# Patient Record
Sex: MALE | Race: WHITE | Employment: OTHER | URBAN - METROPOLITAN AREA
[De-identification: names, ages, dates, MRNs, and addresses within clinical notes are randomized per-mention and may not be internally consistent; named-entity substitution may affect disease eponyms.]

---

## 2022-03-26 ASSESSMENT — PROMIS GLOBAL HEALTH SCALE
IN THE PAST 7 DAYS, HOW OFTEN HAVE YOU BEEN BOTHERED BY EMOTIONAL PROBLEMS, SUCH AS FEELING ANXIOUS, DEPRESSED, OR IRRITABLE [ON A SCALE FROM 1 (NEVER) TO 5 (ALWAYS)]?: 2
IN THE PAST 7 DAYS, HOW WOULD YOU RATE YOUR FATIGUE ON AVERAGE [ON A SCALE FROM 1 (NONE) TO 5 (VERY SEVERE)]?: 3
TO WHAT EXTENT ARE YOU ABLE TO CARRY OUT YOUR EVERYDAY PHYSICAL ACTIVITIES SUCH AS WALKING, CLIMBING STAIRS, CARRYING GROCERIES, OR MOVING A CHAIR [ON A SCALE OF 1 (NOT AT ALL) TO 5 (COMPLETELY)]?: 2
IN GENERAL, HOW WOULD YOU RATE YOUR PHYSICAL HEALTH [ON A SCALE OF 1 (POOR) TO 5 (EXCELLENT)]?: 3
WHO IS THE PERSON COMPLETING THE PROMIS V1.1 SURVEY?: 0
IN GENERAL, HOW WOULD YOU RATE YOUR MENTAL HEALTH, INCLUDING YOUR MOOD AND YOUR ABILITY TO THINK [ON A SCALE OF 1 (POOR) TO 5 (EXCELLENT)]?: 4
IN GENERAL, PLEASE RATE HOW WELL YOU CARRY OUT YOUR USUAL SOCIAL ACTIVITIES (INCLUDES ACTIVITIES AT HOME, AT WORK, AND IN YOUR COMMUNITY, AND RESPONSIBILITIES AS A PARENT, CHILD, SPOUSE, EMPLOYEE, FRIEND, ETC) [ON A SCALE OF 1 (POOR) TO 5 (EXCELLENT)]?: 1
IN GENERAL, WOULD YOU SAY YOUR HEALTH IS...[ON A SCALE OF 1 (POOR) TO 5 (EXCELLENT)]: 3
SUM OF RESPONSES TO QUESTIONS 3, 6, 7, & 8: 15
IN GENERAL, HOW WOULD YOU RATE YOUR SATISFACTION WITH YOUR SOCIAL ACTIVITIES AND RELATIONSHIPS [ON A SCALE OF 1 (POOR) TO 5 (EXCELLENT)]?: 3
SUM OF RESPONSES TO QUESTIONS 2, 4, 5, & 10: 12
IN THE PAST 7 DAYS, HOW WOULD YOU RATE YOUR PAIN ON AVERAGE [ON A SCALE FROM 0 (NO PAIN) TO 10 (WORST IMAGINABLE PAIN)]?: 7
IN GENERAL, WOULD YOU SAY YOUR QUALITY OF LIFE IS...[ON A SCALE OF 1 (POOR) TO 5 (EXCELLENT)]: 3
HOW IS THE PROMIS V1.1 BEING ADMINISTERED?: 2

## 2022-03-26 ASSESSMENT — HOOS JR
HOOS JR RAW SCORE: 13
BENDING TO THE FLOOR TO PICK UP OBJECT: 2
WALKING ON UNEVEN SURFACE: 3
RISING FROM SITTING: 2
GOING UP OR DOWN STAIRS: 3
LYING IN BED (TURNING OVER, MAINTAINING HIP POSITION): 2
SITTING: 1

## 2022-04-08 ENCOUNTER — HOSPITAL ENCOUNTER (OUTPATIENT)
Dept: PREADMISSION TESTING | Age: 72
Discharge: HOME | End: 2022-04-08
Payer: MEDICARE

## 2022-04-08 VITALS
RESPIRATION RATE: 18 BRPM | TEMPERATURE: 98 F | SYSTOLIC BLOOD PRESSURE: 163 MMHG | BODY MASS INDEX: 38.34 KG/M2 | DIASTOLIC BLOOD PRESSURE: 74 MMHG | OXYGEN SATURATION: 95 % | HEIGHT: 68 IN | WEIGHT: 253 LBS | HEART RATE: 70 BPM

## 2022-04-08 PROCEDURE — 87081 CULTURE SCREEN ONLY: CPT

## 2022-04-08 RX ORDER — LOSARTAN POTASSIUM 100 MG/1
100 TABLET ORAL DAILY
COMMUNITY

## 2022-04-08 RX ORDER — ATORVASTATIN CALCIUM 20 MG/1
20 TABLET, FILM COATED ORAL DAILY
COMMUNITY

## 2022-04-08 RX ORDER — GABAPENTIN 300 MG/1
300 CAPSULE ORAL 3 TIMES DAILY
COMMUNITY

## 2022-04-08 RX ORDER — HYDROCODONE BITARTRATE AND ACETAMINOPHEN 7.5; 325 MG/1; MG/1
1 TABLET ORAL EVERY 8 HOURS PRN
Status: ON HOLD | COMMUNITY
End: 2022-04-14 | Stop reason: HOSPADM

## 2022-04-08 RX ORDER — ASPIRIN 81 MG/1
81 TABLET ORAL DAILY
Status: ON HOLD | COMMUNITY
End: 2022-04-14 | Stop reason: HOSPADM

## 2022-04-08 RX ORDER — VENLAFAXINE HYDROCHLORIDE 150 MG/1
150 CAPSULE, EXTENDED RELEASE ORAL DAILY
COMMUNITY

## 2022-04-08 RX ORDER — MELOXICAM 15 MG/1
15 TABLET ORAL DAILY
COMMUNITY

## 2022-04-08 RX ORDER — DOXAZOSIN MESYLATE 4 MG/1
4 TABLET ORAL 2 TIMES DAILY
COMMUNITY

## 2022-04-08 RX ORDER — NAPROXEN 250 MG/1
250 TABLET ORAL 2 TIMES DAILY WITH MEALS
Status: ON HOLD | COMMUNITY
End: 2022-04-14 | Stop reason: HOSPADM

## 2022-04-08 RX ORDER — LEVOTHYROXINE SODIUM 0.03 MG/1
25 TABLET ORAL DAILY
COMMUNITY

## 2022-04-08 RX ORDER — MIRTAZAPINE 30 MG/1
30 TABLET, FILM COATED ORAL NIGHTLY
COMMUNITY

## 2022-04-08 RX ORDER — CARVEDILOL 25 MG/1
25 TABLET ORAL 2 TIMES DAILY WITH MEALS
COMMUNITY

## 2022-04-08 ASSESSMENT — HOOS JR
BENDING TO THE FLOOR TO PICK UP OBJECT: 2
SITTING: 2
RISING FROM SITTING: 4
LYING IN BED (TURNING OVER, MAINTAINING HIP POSITION): 1
GOING UP OR DOWN STAIRS: 4
HOOS JR RAW SCORE: 17
WALKING ON UNEVEN SURFACE: 4

## 2022-04-08 NOTE — PROGRESS NOTES
[facility] PRE-ADMISSION TESTING INSTRUCTIONS  Surgery date 4-13-22   Arrival time 10:30 a.m. The Preadmission Testing patient is instructed accordingly using the following criteria (check applicable):    ARRIVAL INSTRUCTIONS:  [x] Parking the day of Surgery is located in the Main Entrance lot. Upon entering the door, make an immediate right to the surgery reception desk    [x] Bring photo ID and insurance card    [] Bring in a copy of Living will or Durable Power of  papers. [] Please be sure to arrange for responsible adult to provide transportation to and from the hospital    [] Please arrange for responsible adult to be with you for the 24 hour period post procedure due to having anesthesia      GENERAL INSTRUCTIONS:    [x] Nothing by mouth after midnight, including gum, candy, mints or water    [x] You may brush your teeth, but do not swallow any water    [x] Take medications as instructed with 1-2 oz of water    [x] Stop herbal supplements and vitamins 5 days prior to procedure    [x] Follow preop dosing of blood thinners per physician instructions    [] Take 1/2 dose of evening insulin, but no insulin after midnight    [] No oral diabetic medications after midnight    [] If diabetic and have low blood sugar or feel symptomatic, take 1-2oz apple juice only    [] Bring inhalers day of surgery    [] Bring C-PAP/ Bi-Pap day of surgery    [] Bring urine specimen day of surgery    [] Shower or bath with soap, lather and rinse well, AM of Surgery, no lotion, powders or creams to surgical site    [] Follow bowel prep as instructed per surgeon    [x] No tobacco products within 24 hours of surgery     [x] No alcohol or illegal drug use within 24 hours of surgery.     [x] Jewelry, body piercing's, eyeglasses, contact lenses and dentures are not permitted into surgery (bring cases)      [] Please do not wear any nail polish, make up or hair products on the day of surgery    [x] You can expect a call the business day prior to procedure to notify you if your arrival time changes    [x] If you receive a survey after surgery we would greatly appreciate your comments    [] Parent/guardian of a minor must accompany their child and remain on the premises  the entire time they are under our care     [] Pediatric patients may bring favorite toy, blanket or comfort item with them    [] A caregiver or family member must remain with the patient during their stay if they are mentally handicapped, have dementia, disoriented or unable to use a call light or would be a safety concern if left unattended    [x] Please notify surgeon if you develop any illness between now and time of surgery (cold, cough, sore throat, fever, nausea, vomiting) or any signs of infections  including skin, wounds, and dental.    []  The Outpatient Pharmacy is available to fill your prescription here on your day of surgery, ask your preop nurse for details    [x] Other instructions: Wear comfortable clothing    EDUCATIONAL MATERIALS PROVIDED:    [x] PAT Preoperative Education Packet/Booklet     [x] Medication List    [] Transfusion bracelet applied with instructions    [x] Shower with soap, lather and rinse well, and use CHG wipes provided the evening before surgery as instructed    [x] Incentive spirometer with instructions        Have you been tested for COVID  No           Have you been told you were positive for COVID No  Have you had any known exposure to someone that is positive for COVID No  Do you have a cough                   No              Do you have shortness of breath No                 Do you have a sore throat            No                Are you having chills                    No                Are you having muscle aches. No                    Please come to the hospital wearing a mask and have your significant other wear a mask as well.   Both of you should check your temperature before leaving to come here,  if it is 100 or higher please call [phone] for instruction.

## 2022-04-09 LAB — MRSA CULTURE ONLY: NORMAL

## 2022-04-12 ENCOUNTER — ANESTHESIA EVENT (OUTPATIENT)
Dept: OPERATING ROOM | Age: 72
End: 2022-04-12
Payer: MEDICARE

## 2022-04-12 NOTE — H&P
History and Physical  K. Maddie Fishman MD      Chief Complaint     Juaquin Li returns for follow up of right hip. Date of last x-ray was 01/27/2022. His current pain level is a 9/10. He reports symptoms of pain with activities and night pain. He is taking medication for pain. He arrived today using a cane. Pain: groin, thigh, lateral hip  Symptoms: instability, night pain  Previous Treatment: NSAIDS, home exercise program  Additional Comments: Juaquin Li returns to the office for follow up right hip. He continues to have severe pain. Currently pain is 9/10. Pain is worse with activity, putting on socks and shoes, getting in and out a car and pain improves with rest. He takes tylenol and ibuprofen for pain. He has been doing home exercises. He has been trying to lose weight. The pain has become a significant quality of life issue.      Physical Exam   General Appearance:   Awake, alert, oriented x3  Well developed, well nourished  No acute distress  Eyes appear Normal    Respiratory:       Respiratory effort: non-labored    Cardiovascular:   Edema: absent      Varicosities: absent    Lymphatic/Skin:       Skin Appearance: Normal              Right Hip Exam   Skin Exam:    skin intact  Painful ROM:   yes  FADIR:   yes  TIN:   yes  Other:   Sensation intact to light touch L1-S1  TA/EHL/GS intact  Palpable DP, W/WP  Calf soft, non tender       Past Medical History - reviewed    Arthritis  Bruise easily  Depression  High blood pressure  Obesity    Family History - reviewed  Cancer Brother,Heart Disease Father,Arthritis Mother,Arthritis Brother,Arthritis Sister,Arthritis Grandfather - Mother's Side    Surgical History - reviewed    Hand right  Carpal tunnel left  Broken bones  right leg   appendix    Social History - reviewed  Hand dominance: right  Occupation: retired    Risk Factors - reviewed  Patient had a flu shot in the last 12 months? no  The patient is 72 years or older and has had a pneumonia vaccine: yes  Patient has an implant none  Tobacco status: never smoker  Alcohol use: unknown  Does patient exercise? yes  In the past 12 months, have you fallen? no        Current Medications (including meds started today):   naproxen sodium 220 mg tablet (naproxen sodium) every night   meloxicam 15 mg tablet (meloxicam)   atorvastatin 20 mg tablet (atorvastatin)   levothyroxine 25 mcg tablet (levothyroxine) Take 1 tablet by mouth once a day   carvedilol 25 mg tablet (carvedilol) Take 1 tablet by mouth twice a day   doxazosin 4 mg tablet (doxazosin) Take 1 tablet by mouth twice a day   losartan 100 mg tablet (losartan) Take 1 tablet by mouth once a day   venlafaxine 150 mg capsule,extended release 24hr (venlafaxine) capsule by mouth   mirtazapine 30 mg tablet (mirtazapine) tablet by mouth   gabapentin 300 mg capsule (gabapentin) capsule by mouth   hydrocodone-acetaminophen 7.5-325 mg tablet (hydrocodone-acetaminophen) tablet by mouth   Aspirin Low Dose 81 mg tablet,delayed release (DR/EC) (aspirin)       Current Allergies (reviewed this update):  No known allergies      Vital Signs   Weight: 245.99 lbs. (111.58 kg.)  Height: 68 in.    (172.72 cm.)  Pulse Rate: 77  Blood Pressure: 154/93 mm Hg  Body Mass Index: 37.40  Body Surface Area: 2.23 m2      Review of Systems   General: Positive for NONE. Eyes: Positive for vision loss - 1 eye. ENT: Positive for NONE. Cardiovascular: Positive for NONE. Respiratory: Positive for NONE. Gastrointestinal: Positive for NONE. Genitourinary: Positive for urinary frequency,urinary urgency. Musculoskeletal: Positive for joint swelling,stiffness,joint pain,arthritis,muscle weakness,loss of strength. Skin: Positive for poor wound healing. Neurologic: Positive for NONE. Psychiatric: Positive for anxiety,depression. Heme/Lymphatic: Positive for NONE. Allergic/Immunologic: Positive for NONE.     The remainder of the complete review of systems was negative. Impression   1. Right hip osteoarthrits: Deteriorated  2. Hip pain, right: Deteriorated    Plan of Care:   I had a long discussion regarding degenerative joint disease. Operative and nonoperative treatment was discussed. Xrays show severe degenerative changes, nonoperative treatment was failed and pain has become a quality of life issue. Morse Boxer would like to proceed with a Right Jens Total Hip Arthroplasty. The surgery, implants, postoperative course and risks and benefits of surgery were discussed and all questions were answered.

## 2022-04-13 ENCOUNTER — HOSPITAL ENCOUNTER (OUTPATIENT)
Age: 72
Setting detail: OBSERVATION
Discharge: HOME | End: 2022-04-14
Attending: ORTHOPAEDIC SURGERY | Admitting: ORTHOPAEDIC SURGERY
Payer: MEDICARE

## 2022-04-13 ENCOUNTER — ANESTHESIA (OUTPATIENT)
Dept: OPERATING ROOM | Age: 72
End: 2022-04-13
Payer: MEDICARE

## 2022-04-13 ENCOUNTER — APPOINTMENT (OUTPATIENT)
Dept: GENERAL RADIOLOGY | Age: 72
End: 2022-04-13
Attending: ORTHOPAEDIC SURGERY
Payer: MEDICARE

## 2022-04-13 VITALS
DIASTOLIC BLOOD PRESSURE: 65 MMHG | SYSTOLIC BLOOD PRESSURE: 118 MMHG | RESPIRATION RATE: 12 BRPM | TEMPERATURE: 94.8 F | OXYGEN SATURATION: 94 %

## 2022-04-13 DIAGNOSIS — Z96.641 STATUS POST TOTAL HIP REPLACEMENT, RIGHT: Primary | ICD-10-CM

## 2022-04-13 PROCEDURE — 88304 TISSUE EXAM BY PATHOLOGIST: CPT

## 2022-04-13 PROCEDURE — 7100000001 HC PACU RECOVERY - ADDTL 15 MIN: Performed by: ORTHOPAEDIC SURGERY

## 2022-04-13 PROCEDURE — 2500000003 HC RX 250 WO HCPCS: Performed by: ORTHOPAEDIC SURGERY

## 2022-04-13 PROCEDURE — 97165 OT EVAL LOW COMPLEX 30 MIN: CPT

## 2022-04-13 PROCEDURE — 97530 THERAPEUTIC ACTIVITIES: CPT

## 2022-04-13 PROCEDURE — 2580000003 HC RX 258: Performed by: NURSE ANESTHETIST, CERTIFIED REGISTERED

## 2022-04-13 PROCEDURE — 2580000003 HC RX 258: Performed by: ORTHOPAEDIC SURGERY

## 2022-04-13 PROCEDURE — 97535 SELF CARE MNGMENT TRAINING: CPT

## 2022-04-13 PROCEDURE — 3700000000 HC ANESTHESIA ATTENDED CARE: Performed by: ORTHOPAEDIC SURGERY

## 2022-04-13 PROCEDURE — 3600000005 HC SURGERY LEVEL 5 BASE: Performed by: ORTHOPAEDIC SURGERY

## 2022-04-13 PROCEDURE — 6360000002 HC RX W HCPCS: Performed by: ORTHOPAEDIC SURGERY

## 2022-04-13 PROCEDURE — A4217 STERILE WATER/SALINE, 500 ML: HCPCS | Performed by: ORTHOPAEDIC SURGERY

## 2022-04-13 PROCEDURE — 2720000010 HC SURG SUPPLY STERILE: Performed by: ORTHOPAEDIC SURGERY

## 2022-04-13 PROCEDURE — 6360000002 HC RX W HCPCS: Performed by: NURSE ANESTHETIST, CERTIFIED REGISTERED

## 2022-04-13 PROCEDURE — 3600000015 HC SURGERY LEVEL 5 ADDTL 15MIN: Performed by: ORTHOPAEDIC SURGERY

## 2022-04-13 PROCEDURE — 7100000000 HC PACU RECOVERY - FIRST 15 MIN: Performed by: ORTHOPAEDIC SURGERY

## 2022-04-13 PROCEDURE — 6370000000 HC RX 637 (ALT 250 FOR IP): Performed by: ORTHOPAEDIC SURGERY

## 2022-04-13 PROCEDURE — 51798 US URINE CAPACITY MEASURE: CPT

## 2022-04-13 PROCEDURE — G0378 HOSPITAL OBSERVATION PER HR: HCPCS

## 2022-04-13 PROCEDURE — C1713 ANCHOR/SCREW BN/BN,TIS/BN: HCPCS | Performed by: ORTHOPAEDIC SURGERY

## 2022-04-13 PROCEDURE — 3700000001 HC ADD 15 MINUTES (ANESTHESIA): Performed by: ORTHOPAEDIC SURGERY

## 2022-04-13 PROCEDURE — 97161 PT EVAL LOW COMPLEX 20 MIN: CPT

## 2022-04-13 PROCEDURE — 2500000003 HC RX 250 WO HCPCS: Performed by: NURSE ANESTHETIST, CERTIFIED REGISTERED

## 2022-04-13 PROCEDURE — L8690 AUD OSSEO DEV, INT/EXT COMP: HCPCS | Performed by: ORTHOPAEDIC SURGERY

## 2022-04-13 PROCEDURE — C1776 JOINT DEVICE (IMPLANTABLE): HCPCS | Performed by: ORTHOPAEDIC SURGERY

## 2022-04-13 PROCEDURE — 2709999900 HC NON-CHARGEABLE SUPPLY: Performed by: ORTHOPAEDIC SURGERY

## 2022-04-13 PROCEDURE — 73502 X-RAY EXAM HIP UNI 2-3 VIEWS: CPT

## 2022-04-13 PROCEDURE — 88311 DECALCIFY TISSUE: CPT

## 2022-04-13 DEVICE — 127 DEGREE CEMENTED HIP STEM
Type: IMPLANTABLE DEVICE | Site: HIP | Status: FUNCTIONAL
Brand: ACCOLADE

## 2022-04-13 DEVICE — DISTAL SPACER
Type: IMPLANTABLE DEVICE | Site: HIP | Status: FUNCTIONAL
Brand: ACCOLADE

## 2022-04-13 DEVICE — FULL DOSE BONE CEMENT, 10 PACK CATALOG NUMBER IS 6191-1-010
Type: IMPLANTABLE DEVICE | Site: HIP | Status: FUNCTIONAL
Brand: SIMPLEX

## 2022-04-13 DEVICE — CERAMIC V40 FEMORAL HEAD
Type: IMPLANTABLE DEVICE | Site: HIP | Status: FUNCTIONAL
Brand: BIOLOX

## 2022-04-13 DEVICE — TRIDENT X3 0 DEGREE POLYETHYLENE INSERT
Type: IMPLANTABLE DEVICE | Site: HIP | Status: FUNCTIONAL
Brand: TRIDENT X3 INSERT

## 2022-04-13 DEVICE — SET CBL SL SM DIA2MM VIT BEAD DALL-M: Type: IMPLANTABLE DEVICE | Site: HIP | Status: FUNCTIONAL

## 2022-04-13 DEVICE — UNIVERSAL CEMENT RESTRICTOR - DISPOSABLE INSERTER
Type: IMPLANTABLE DEVICE | Site: HIP | Status: FUNCTIONAL
Brand: RESTRICTOR

## 2022-04-13 DEVICE — SHELL ACET SZ D DIA48MM 3 CLUS H TRITANIUM PRESSFIT PRI: Type: IMPLANTABLE DEVICE | Site: HIP | Status: FUNCTIONAL

## 2022-04-13 RX ORDER — ATORVASTATIN CALCIUM 20 MG/1
20 TABLET, FILM COATED ORAL DAILY
Status: DISCONTINUED | OUTPATIENT
Start: 2022-04-13 | End: 2022-04-14 | Stop reason: HOSPADM

## 2022-04-13 RX ORDER — CELECOXIB 100 MG/1
100 CAPSULE ORAL ONCE
Status: COMPLETED | OUTPATIENT
Start: 2022-04-13 | End: 2022-04-13

## 2022-04-13 RX ORDER — SODIUM CHLORIDE 0.9 % (FLUSH) 0.9 %
10 SYRINGE (ML) INJECTION EVERY 12 HOURS SCHEDULED
Status: DISCONTINUED | OUTPATIENT
Start: 2022-04-13 | End: 2022-04-14 | Stop reason: HOSPADM

## 2022-04-13 RX ORDER — ONDANSETRON 2 MG/ML
4 INJECTION INTRAMUSCULAR; INTRAVENOUS EVERY 6 HOURS PRN
Status: DISCONTINUED | OUTPATIENT
Start: 2022-04-13 | End: 2022-04-14 | Stop reason: HOSPADM

## 2022-04-13 RX ORDER — SODIUM CHLORIDE 9 MG/ML
25 INJECTION, SOLUTION INTRAVENOUS PRN
Status: DISCONTINUED | OUTPATIENT
Start: 2022-04-13 | End: 2022-04-13 | Stop reason: HOSPADM

## 2022-04-13 RX ORDER — SODIUM CHLORIDE 0.9 % (FLUSH) 0.9 %
5-40 SYRINGE (ML) INJECTION PRN
Status: DISCONTINUED | OUTPATIENT
Start: 2022-04-13 | End: 2022-04-13 | Stop reason: HOSPADM

## 2022-04-13 RX ORDER — LOSARTAN POTASSIUM 50 MG/1
100 TABLET ORAL DAILY
Status: DISCONTINUED | OUTPATIENT
Start: 2022-04-13 | End: 2022-04-14 | Stop reason: HOSPADM

## 2022-04-13 RX ORDER — DEXAMETHASONE SODIUM PHOSPHATE 10 MG/ML
10 INJECTION INTRAMUSCULAR; INTRAVENOUS ONCE
Status: COMPLETED | OUTPATIENT
Start: 2022-04-14 | End: 2022-04-14

## 2022-04-13 RX ORDER — SODIUM CHLORIDE 0.9 % (FLUSH) 0.9 %
5-40 SYRINGE (ML) INJECTION EVERY 12 HOURS SCHEDULED
Status: DISCONTINUED | OUTPATIENT
Start: 2022-04-13 | End: 2022-04-13 | Stop reason: HOSPADM

## 2022-04-13 RX ORDER — GABAPENTIN 300 MG/1
300 CAPSULE ORAL 3 TIMES DAILY
Status: DISCONTINUED | OUTPATIENT
Start: 2022-04-13 | End: 2022-04-14 | Stop reason: HOSPADM

## 2022-04-13 RX ORDER — SENNA AND DOCUSATE SODIUM 50; 8.6 MG/1; MG/1
1 TABLET, FILM COATED ORAL 2 TIMES DAILY
Status: DISCONTINUED | OUTPATIENT
Start: 2022-04-13 | End: 2022-04-14 | Stop reason: HOSPADM

## 2022-04-13 RX ORDER — VENLAFAXINE HYDROCHLORIDE 150 MG/1
150 CAPSULE, EXTENDED RELEASE ORAL DAILY
Status: DISCONTINUED | OUTPATIENT
Start: 2022-04-14 | End: 2022-04-14 | Stop reason: HOSPADM

## 2022-04-13 RX ORDER — VANCOMYCIN HYDROCHLORIDE 500 MG/10ML
INJECTION, POWDER, LYOPHILIZED, FOR SOLUTION INTRAVENOUS PRN
Status: DISCONTINUED | OUTPATIENT
Start: 2022-04-13 | End: 2022-04-13 | Stop reason: HOSPADM

## 2022-04-13 RX ORDER — SODIUM CHLORIDE 9 MG/ML
INJECTION, SOLUTION INTRAVENOUS PRN
Status: DISCONTINUED | OUTPATIENT
Start: 2022-04-13 | End: 2022-04-13 | Stop reason: HOSPADM

## 2022-04-13 RX ORDER — SODIUM CHLORIDE 9 MG/ML
25 INJECTION, SOLUTION INTRAVENOUS PRN
Status: DISCONTINUED | OUTPATIENT
Start: 2022-04-13 | End: 2022-04-14 | Stop reason: HOSPADM

## 2022-04-13 RX ORDER — LEVOTHYROXINE SODIUM 0.03 MG/1
25 TABLET ORAL DAILY
Status: DISCONTINUED | OUTPATIENT
Start: 2022-04-13 | End: 2022-04-14 | Stop reason: HOSPADM

## 2022-04-13 RX ORDER — PROPOFOL 10 MG/ML
INJECTION, EMULSION INTRAVENOUS CONTINUOUS PRN
Status: DISCONTINUED | OUTPATIENT
Start: 2022-04-13 | End: 2022-04-13 | Stop reason: SDUPTHER

## 2022-04-13 RX ORDER — DOXAZOSIN MESYLATE 4 MG/1
4 TABLET ORAL 2 TIMES DAILY
Status: DISCONTINUED | OUTPATIENT
Start: 2022-04-13 | End: 2022-04-14 | Stop reason: HOSPADM

## 2022-04-13 RX ORDER — GLYCOPYRROLATE 0.2 MG/ML
INJECTION INTRAMUSCULAR; INTRAVENOUS PRN
Status: DISCONTINUED | OUTPATIENT
Start: 2022-04-13 | End: 2022-04-13 | Stop reason: SDUPTHER

## 2022-04-13 RX ORDER — DEXAMETHASONE SODIUM PHOSPHATE 10 MG/ML
8 INJECTION, SOLUTION INTRAMUSCULAR; INTRAVENOUS ONCE
Status: COMPLETED | OUTPATIENT
Start: 2022-04-13 | End: 2022-04-13

## 2022-04-13 RX ORDER — SODIUM CHLORIDE, SODIUM LACTATE, POTASSIUM CHLORIDE, CALCIUM CHLORIDE 600; 310; 30; 20 MG/100ML; MG/100ML; MG/100ML; MG/100ML
INJECTION, SOLUTION INTRAVENOUS CONTINUOUS PRN
Status: DISCONTINUED | OUTPATIENT
Start: 2022-04-13 | End: 2022-04-13 | Stop reason: SDUPTHER

## 2022-04-13 RX ORDER — MIRTAZAPINE 15 MG/1
30 TABLET, FILM COATED ORAL NIGHTLY
Status: DISCONTINUED | OUTPATIENT
Start: 2022-04-13 | End: 2022-04-14 | Stop reason: HOSPADM

## 2022-04-13 RX ORDER — ACETAMINOPHEN 325 MG/1
650 TABLET ORAL EVERY 6 HOURS
Status: DISCONTINUED | OUTPATIENT
Start: 2022-04-13 | End: 2022-04-14 | Stop reason: HOSPADM

## 2022-04-13 RX ORDER — OXYCODONE HYDROCHLORIDE 5 MG/1
10 TABLET ORAL EVERY 4 HOURS PRN
Status: DISCONTINUED | OUTPATIENT
Start: 2022-04-13 | End: 2022-04-14 | Stop reason: HOSPADM

## 2022-04-13 RX ORDER — SODIUM CHLORIDE 9 MG/ML
INJECTION, SOLUTION INTRAVENOUS CONTINUOUS
Status: ACTIVE | OUTPATIENT
Start: 2022-04-13 | End: 2022-04-14

## 2022-04-13 RX ORDER — KETAMINE HYDROCHLORIDE 10 MG/ML
INJECTION, SOLUTION INTRAMUSCULAR; INTRAVENOUS PRN
Status: DISCONTINUED | OUTPATIENT
Start: 2022-04-13 | End: 2022-04-13 | Stop reason: SDUPTHER

## 2022-04-13 RX ORDER — SODIUM CHLORIDE 9 MG/ML
INJECTION, SOLUTION INTRAVENOUS CONTINUOUS
Status: DISCONTINUED | OUTPATIENT
Start: 2022-04-13 | End: 2022-04-13

## 2022-04-13 RX ORDER — OXYCODONE HYDROCHLORIDE 15 MG/1
15 TABLET ORAL EVERY 4 HOURS PRN
Status: DISCONTINUED | OUTPATIENT
Start: 2022-04-13 | End: 2022-04-14 | Stop reason: HOSPADM

## 2022-04-13 RX ORDER — CARVEDILOL 25 MG/1
25 TABLET ORAL 2 TIMES DAILY WITH MEALS
Status: DISCONTINUED | OUTPATIENT
Start: 2022-04-13 | End: 2022-04-14 | Stop reason: HOSPADM

## 2022-04-13 RX ORDER — ACETAMINOPHEN 500 MG
1000 TABLET ORAL ONCE
Status: COMPLETED | OUTPATIENT
Start: 2022-04-13 | End: 2022-04-13

## 2022-04-13 RX ORDER — FENTANYL CITRATE 50 UG/ML
INJECTION, SOLUTION INTRAMUSCULAR; INTRAVENOUS PRN
Status: DISCONTINUED | OUTPATIENT
Start: 2022-04-13 | End: 2022-04-13 | Stop reason: SDUPTHER

## 2022-04-13 RX ORDER — MIDAZOLAM HYDROCHLORIDE 1 MG/ML
INJECTION INTRAMUSCULAR; INTRAVENOUS PRN
Status: DISCONTINUED | OUTPATIENT
Start: 2022-04-13 | End: 2022-04-13 | Stop reason: SDUPTHER

## 2022-04-13 RX ORDER — KETOROLAC TROMETHAMINE 30 MG/ML
15 INJECTION, SOLUTION INTRAMUSCULAR; INTRAVENOUS EVERY 6 HOURS
Status: DISCONTINUED | OUTPATIENT
Start: 2022-04-13 | End: 2022-04-14 | Stop reason: HOSPADM

## 2022-04-13 RX ORDER — SODIUM CHLORIDE 0.9 % (FLUSH) 0.9 %
10 SYRINGE (ML) INJECTION PRN
Status: DISCONTINUED | OUTPATIENT
Start: 2022-04-13 | End: 2022-04-14 | Stop reason: HOSPADM

## 2022-04-13 RX ADMIN — GABAPENTIN 300 MG: 300 CAPSULE ORAL at 14:51

## 2022-04-13 RX ADMIN — MIRTAZAPINE 30 MG: 15 TABLET, FILM COATED ORAL at 22:14

## 2022-04-13 RX ADMIN — ASPIRIN 325 MG: 325 TABLET, COATED ORAL at 14:51

## 2022-04-13 RX ADMIN — PHENYLEPHRINE HYDROCHLORIDE 200 MCG: 10 INJECTION INTRAVENOUS at 09:52

## 2022-04-13 RX ADMIN — FENTANYL CITRATE 25 MCG: 50 INJECTION, SOLUTION INTRAMUSCULAR; INTRAVENOUS at 10:43

## 2022-04-13 RX ADMIN — PHENYLEPHRINE HYDROCHLORIDE 100 MCG: 10 INJECTION INTRAVENOUS at 10:05

## 2022-04-13 RX ADMIN — KETOROLAC TROMETHAMINE 15 MG: 30 INJECTION, SOLUTION INTRAMUSCULAR; INTRAVENOUS at 14:51

## 2022-04-13 RX ADMIN — PROPOFOL 50 MCG/KG/MIN: 10 INJECTION, EMULSION INTRAVENOUS at 09:44

## 2022-04-13 RX ADMIN — Medication 2000 MG: at 18:17

## 2022-04-13 RX ADMIN — ACETAMINOPHEN 650 MG: 325 TABLET ORAL at 22:14

## 2022-04-13 RX ADMIN — OXYCODONE 15 MG: 15 TABLET ORAL at 17:43

## 2022-04-13 RX ADMIN — SODIUM CHLORIDE: 9 INJECTION, SOLUTION INTRAVENOUS at 10:00

## 2022-04-13 RX ADMIN — LOSARTAN POTASSIUM 100 MG: 50 TABLET, FILM COATED ORAL at 14:51

## 2022-04-13 RX ADMIN — PHENYLEPHRINE HYDROCHLORIDE 200 MCG: 10 INJECTION INTRAVENOUS at 09:32

## 2022-04-13 RX ADMIN — Medication 2000 MG: at 09:05

## 2022-04-13 RX ADMIN — DOCUSATE SODIUM 50 MG AND SENNOSIDES 8.6 MG 1 TABLET: 8.6; 5 TABLET, FILM COATED ORAL at 22:13

## 2022-04-13 RX ADMIN — PHENYLEPHRINE HYDROCHLORIDE 100 MCG: 10 INJECTION INTRAVENOUS at 10:08

## 2022-04-13 RX ADMIN — FENTANYL CITRATE 25 MCG: 50 INJECTION, SOLUTION INTRAMUSCULAR; INTRAVENOUS at 11:36

## 2022-04-13 RX ADMIN — PHENYLEPHRINE HYDROCHLORIDE 100 MCG: 10 INJECTION INTRAVENOUS at 09:30

## 2022-04-13 RX ADMIN — PHENYLEPHRINE HYDROCHLORIDE 150 MCG: 10 INJECTION INTRAVENOUS at 09:46

## 2022-04-13 RX ADMIN — PHENYLEPHRINE HYDROCHLORIDE 200 MCG: 10 INJECTION INTRAVENOUS at 10:11

## 2022-04-13 RX ADMIN — ACETAMINOPHEN 650 MG: 325 TABLET ORAL at 14:51

## 2022-04-13 RX ADMIN — MIDAZOLAM 1 MG: 1 INJECTION INTRAMUSCULAR; INTRAVENOUS at 09:11

## 2022-04-13 RX ADMIN — KETOROLAC TROMETHAMINE 15 MG: 30 INJECTION, SOLUTION INTRAMUSCULAR; INTRAVENOUS at 22:14

## 2022-04-13 RX ADMIN — SODIUM CHLORIDE, PRESERVATIVE FREE 10 ML: 5 INJECTION INTRAVENOUS at 22:15

## 2022-04-13 RX ADMIN — MIDAZOLAM 1 MG: 1 INJECTION INTRAMUSCULAR; INTRAVENOUS at 09:00

## 2022-04-13 RX ADMIN — PHENYLEPHRINE HYDROCHLORIDE 200 MCG: 10 INJECTION INTRAVENOUS at 09:56

## 2022-04-13 RX ADMIN — MIDAZOLAM 1 MG: 1 INJECTION INTRAMUSCULAR; INTRAVENOUS at 09:50

## 2022-04-13 RX ADMIN — KETAMINE HYDROCHLORIDE 30 MG: 10 INJECTION INTRAMUSCULAR; INTRAVENOUS at 09:29

## 2022-04-13 RX ADMIN — CARVEDILOL 25 MG: 25 TABLET, FILM COATED ORAL at 18:17

## 2022-04-13 RX ADMIN — ATORVASTATIN CALCIUM 20 MG: 20 TABLET, FILM COATED ORAL at 14:51

## 2022-04-13 RX ADMIN — GLYCOPYRROLATE 0.2 MG: 0.2 INJECTION INTRAMUSCULAR; INTRAVENOUS at 09:15

## 2022-04-13 RX ADMIN — TRANEXAMIC ACID 1000 MG: 1 INJECTION, SOLUTION INTRAVENOUS at 12:38

## 2022-04-13 RX ADMIN — PHENYLEPHRINE HYDROCHLORIDE 100 MCG: 10 INJECTION INTRAVENOUS at 09:26

## 2022-04-13 RX ADMIN — LEVOTHYROXINE SODIUM 25 MCG: 0.03 TABLET ORAL at 14:51

## 2022-04-13 RX ADMIN — MIDAZOLAM 1 MG: 1 INJECTION INTRAMUSCULAR; INTRAVENOUS at 09:27

## 2022-04-13 RX ADMIN — ACETAMINOPHEN 1000 MG: 500 TABLET ORAL at 07:47

## 2022-04-13 RX ADMIN — SODIUM CHLORIDE: 9 INJECTION, SOLUTION INTRAVENOUS at 09:03

## 2022-04-13 RX ADMIN — KETAMINE HYDROCHLORIDE 20 MG: 10 INJECTION INTRAMUSCULAR; INTRAVENOUS at 10:07

## 2022-04-13 RX ADMIN — DOXAZOSIN 4 MG: 4 TABLET ORAL at 22:28

## 2022-04-13 RX ADMIN — TRANEXAMIC ACID 1000 MG: 1 INJECTION, SOLUTION INTRAVENOUS at 09:24

## 2022-04-13 RX ADMIN — PROPOFOL 50 MCG/KG/MIN: 10 INJECTION, EMULSION INTRAVENOUS at 09:26

## 2022-04-13 RX ADMIN — DEXAMETHASONE SODIUM PHOSPHATE 10 MG: 10 INJECTION, SOLUTION INTRAMUSCULAR; INTRAVENOUS at 09:45

## 2022-04-13 RX ADMIN — FENTANYL CITRATE 25 MCG: 50 INJECTION, SOLUTION INTRAMUSCULAR; INTRAVENOUS at 11:44

## 2022-04-13 RX ADMIN — OXYCODONE 15 MG: 15 TABLET ORAL at 22:14

## 2022-04-13 RX ADMIN — SODIUM CHLORIDE, POTASSIUM CHLORIDE, SODIUM LACTATE AND CALCIUM CHLORIDE: 600; 310; 30; 20 INJECTION, SOLUTION INTRAVENOUS at 11:29

## 2022-04-13 RX ADMIN — CELECOXIB 100 MG: 100 CAPSULE ORAL at 07:47

## 2022-04-13 RX ADMIN — GABAPENTIN 300 MG: 300 CAPSULE ORAL at 22:14

## 2022-04-13 RX ADMIN — DOCUSATE SODIUM 50 MG AND SENNOSIDES 8.6 MG 1 TABLET: 8.6; 5 TABLET, FILM COATED ORAL at 14:51

## 2022-04-13 RX ADMIN — PHENYLEPHRINE HYDROCHLORIDE 300 MCG: 10 INJECTION INTRAVENOUS at 10:01

## 2022-04-13 RX ADMIN — SODIUM CHLORIDE: 9 INJECTION, SOLUTION INTRAVENOUS at 14:50

## 2022-04-13 RX ADMIN — PHENYLEPHRINE HYDROCHLORIDE 200 MCG: 10 INJECTION INTRAVENOUS at 10:15

## 2022-04-13 RX ADMIN — FENTANYL CITRATE 25 MCG: 50 INJECTION, SOLUTION INTRAMUSCULAR; INTRAVENOUS at 11:30

## 2022-04-13 ASSESSMENT — PAIN DESCRIPTION - LOCATION
LOCATION: HIP

## 2022-04-13 ASSESSMENT — PAIN SCALES - GENERAL
PAINLEVEL_OUTOF10: 7
PAINLEVEL_OUTOF10: 5
PAINLEVEL_OUTOF10: 0
PAINLEVEL_OUTOF10: 5
PAINLEVEL_OUTOF10: 0
PAINLEVEL_OUTOF10: 6
PAINLEVEL_OUTOF10: 0
PAINLEVEL_OUTOF10: 7
PAINLEVEL_OUTOF10: 5
PAINLEVEL_OUTOF10: 5

## 2022-04-13 ASSESSMENT — PULMONARY FUNCTION TESTS
PIF_VALUE: 1
PIF_VALUE: 0
PIF_VALUE: 1
PIF_VALUE: 0
PIF_VALUE: 1
PIF_VALUE: 0
PIF_VALUE: 1
PIF_VALUE: 0
PIF_VALUE: 1
PIF_VALUE: 0
PIF_VALUE: 1

## 2022-04-13 ASSESSMENT — PAIN DESCRIPTION - DESCRIPTORS
DESCRIPTORS: ACHING;SORE
DESCRIPTORS: ACHING;SORE
DESCRIPTORS: SORE
DESCRIPTORS: ACHING;SORE
DESCRIPTORS: SORE

## 2022-04-13 ASSESSMENT — PAIN DESCRIPTION - PROGRESSION
CLINICAL_PROGRESSION: GRADUALLY WORSENING
CLINICAL_PROGRESSION: GRADUALLY WORSENING
CLINICAL_PROGRESSION: NOT CHANGED

## 2022-04-13 ASSESSMENT — PAIN - FUNCTIONAL ASSESSMENT
PAIN_FUNCTIONAL_ASSESSMENT: ACTIVITIES ARE NOT PREVENTED
PAIN_FUNCTIONAL_ASSESSMENT: 0-10
PAIN_FUNCTIONAL_ASSESSMENT: ACTIVITIES ARE NOT PREVENTED
PAIN_FUNCTIONAL_ASSESSMENT: ACTIVITIES ARE NOT PREVENTED

## 2022-04-13 ASSESSMENT — PAIN DESCRIPTION - ORIENTATION
ORIENTATION: RIGHT

## 2022-04-13 ASSESSMENT — PAIN DESCRIPTION - FREQUENCY
FREQUENCY: INTERMITTENT

## 2022-04-13 ASSESSMENT — PAIN DESCRIPTION - ONSET
ONSET: ON-GOING

## 2022-04-13 ASSESSMENT — PAIN DESCRIPTION - PAIN TYPE
TYPE: SURGICAL PAIN

## 2022-04-13 NOTE — OP NOTE
affixed. A skin incision then was made centered over the greater trochanter. A posterior approach to the hip joint was used. The IT band and gluteus fascia were split and a Charnley retractor was placed for deep exposure. Bursal tissue was cleared over the trochanter and short external rotators. An assistant internally rotated the hip and a fred retractor was placed beneath the gluteus medius muscle. The plane between gluteus minimus and the piriformis tendon was developed using electrocautery. The piriformis and short external rotator tendons were released and tagged with #5 ethibond, along with release of a portion of the quadratus femoris muscle. The capsule was then incised in a T-fashion. It was tagged with #1 ethibond and deep retractors were repositioned. The hip was then dislocated. A superior marker was affixed to the acetabulum and another attached to the greater trochanter. Retractors were then repositioned and the femoral neck cut was then planned. A saw was then used to make the femoral neck cut. The femoral head was then removed. Retractors were then repositioned for exposure of the acetabulum. The labrum and pulivinar were then excised with a bovie. The acetabulum was then registered using the [facility] protocol. The [facility] robot was then employed with a plan of 40° abduction and 20° anterversion. The acetabulum was reamed to a size 48. A size 48 acetabular cup was then impacted. The polyethylene insert was then impacted into the cup. Attention was now turned to the femoral canal. The canal was entered using a box osteotome, followed by placement of a . Broaching was initiated with a size 0 broach and was taken up to a size 5 broach in 1mm increments. While broaching I did notice a small 5 mm line along the posterior portion of the calcar neck region that joint to the greater trochanter. I was concerned this may lead to a fracture if I further advanced the stem.   At this point, I did place a cable around the calcar to prevent any further propagation. The canal was then broached with a Accolade C 4 broach. With the final broach in place, there was good canal fill and the implant was stable with rotation. A trial head and neck were placed and the hip was reduced. The hip was stable on exam. The hip was then dislocated and trial implants removed. The hip was then thoroughly irrigated with normal saline. A size 4 Radha Accolade C stem was then cemented using modern techniques. It was once again trialed with a +0 size 36 head. With these components, the hip was stable and had excellent ROM. Final components were then impacted onto the stem and the hip was again reduced. The surgical site was irrigated using pulsatile lavage. The Loraine pins and all Jens markers were removed. The capsule and external rotators were unable to be repaired. The IT band and gluteus fascia were closed using #1 Stratafix. The skin was closed in layers using 2-0, 3-0 Stratafix, dermabond and steri strips. A dry, sterile dressing was applied. The patient was then transferred to an orthopaedic bed with an abduction pillow between the legs. The patient was taken to recovery in stable condition.     Latrice Barros MD

## 2022-04-13 NOTE — PROGRESS NOTES
Occupational Therapy  OCCUPATIONAL THERAPY INITIAL EVALUATION  [facility] [address] [facility]  [address] [facility], [place]    Date: 2022     Patient Name: Tripp Jackson  MRN: [id number]  : 1950  Room: [address] [place] [address]    Evaluating OT: Lc Andersen, OTR/L - [id number]    Referring Provider: Terra Cardona MD  Specific Provider Orders/Date: \"OT eval and treat\" - 2022    Diagnosis: Status post total hip replacement, right [T33.370]    Surgery: Patient underwent R ALICIA on 2022. Pertinent Medical History: HTN, OA, depression, hyperlipidemia     Precautions: fall risk, posterolateral hip precautions, FWBAT    Assessment of Current Deficits:    [x] Functional mobility   [x]ADLs  [x] Strength               [x]Cognition   [x] Functional transfers   [x] IADLs         [x] Safety Awareness   [x]Endurance   [] Fine Coordination              [x] Balance      [] Vision/perception   [x]Sensation    []Gross Motor Coordination  [] ROM  [] Delirium                   [] Motor Control     OT PLAN OF CARE   OT POC is based on physician orders, patient diagnosis, and results of clinical assessment.   Frequency/Duration 2-5 days/week for 2 weeks PRN   Specific OT Treatment Interventions to Include:   * Instruction/training on adapted ADL techniques and AE recommendations to increase functional independence within precautions       * Training on energy conservation strategies, correct breathing pattern and techniques to improve independence/tolerance for self-care routine  * Functional transfer/mobility training/DME recommendations for increased independence, safety, and fall prevention  * Patient/Family education to increase follow through with safety techniques and functional independence  * Recommendation of environmental modifications for increased safety with functional transfers/mobility and ADLs  * Therapeutic exercise to improve motor endurance, ROM, and functional strength for ADLs/functional transfers  * Therapeutic activities to facilitate/challenge dynamic balance, stand tolerance for increased safety and independence with ADLs  * Neuro-muscular re-education: facilitation of righting/equilibrium reactions, midline orientation, scapular stability/mobility, normalization of muscle tone, and facilitation of volitional active controled movement    Recommended Adaptive Equipment: TBD     Home Living: Patient lives with his two sons in a one-floor setup (ramp entry); laundry is on the main living level. Bathroom Setup: walk-in shower (with seat and grab bars) and elevated toilet seat (with rails)  Equipment Owned: cane, rollator    Prior Level of Function (PLOF): Patient was mostly independent with ADLs; patient had needed assistance with socks prior. Patient's family can assist with IADLs. Patient was independent with functional mobility (with cane or rollator) prior to this hospitalization. Pain Level: Patient reported experiencing pain in his R hip and knee (more in the knee than the hip), which he rated 4 out of 10. Cognition: Patient alert and oriented x3. WFL command follow demonstrated. Patient pleasant, cooperative, and motivated to return to [facility] and home environment. Memory: WFL  Sequencing: WFL  Problem Solving: WFL  Judgement/Safety: WFL grossly    Functional Assessment:  AM-PAC Daily Activity Raw Score: 17/24   Initial Eval Status  Date: 4/13/2022 Treatment Status  Date:  Short Term Goals = Long Term Goals   Feeding Independent  N/A   Grooming CGA for hand hygiene while standing at sink. Cues given to maximize safety with management of walker. Mod I  (seated/standing at sink)   UB Dressing Setup  Mod I  (including item retrieval)   LB Dressing Mod A  Mod I - while demonstrating Good adherence to posterolateral hip precautions and Good understanding of AE use.    Bathing Mod A  Mod I - while demonstrating Good adherence to posterolateral hip precautions and Good understanding of AE/DME use. Toileting CGA to stand at toilet; patient unable to void (nursing notified). Cues given to maximize safety with management of walker within bathroom. Mod I   Bed Mobility  Supine-to-Sit: Min A  Independent in order to maximize patient's independence with ADLs, re-positioning, and other functional tasks. Functional Transfers Sit-to-Stand: Min A   from EOB  Independent   Functional Mobility CGA   (with walker) within patient's room, hallway, and bathroom. Mod I with functional mobility (with device, as needed/appropriate) in order to maximize independence with ADLs/IADLs and other functional tasks. Balance Sitting: Good  (at EOB)  Standing: Fair  (with walker)  Fair+ dynamic standing balance during completion of ADLs/IADLs and other functional tasks. Activity Tolerance Fair+  Patient will demonstrate Good understanding and consistent implementation of energy conservation techniques and work simplification techniques into ADL/IADL routines. Visual/  Perceptual WFL  Patient wears glasses. N/A     Additional Long-Term Goal: Patient will increase functional independence to PLOF in order to allow patient to live in least restrictive environment. Strength: ROM: Additional Information:    R UE  WFl WFL    L UE WFL WFL      Hearing: WFL  Sensation: No complaints of numbness/tingling in B UEs. Tone: WFL  Edema: No    Comments: Upon arrival, patient supine in bed. At end of session, patient seated in bedside chair with call light and phone within reach and all lines and tubes intact. Patient would benefit from continued skilled OT to increase safety and independence with completion of ADL/IADL tasks for functional independence and quality of life. Treatment: OT treatment provided this date included:    Instruction/training on safety and adapted techniques for completion of ADLs.   Instruction/training on safe functional mobility/transfer techniques.    Instruction/training on posterolateral hip precautions and implications of these on ADLs, bed mobility, and functional transfers/mobility. Patient education provided regardin) techniques to maximize safety with management of walker, particularly during ADLs, 2) importance of having staff assistance with ADLs and other OOB activities to prevent falls/injury during hospitalization. Patient indicated understanding. Further skilled OT treatment indicated to increase patient's safety and independence with completion of ADL/IADL tasks in order to maximize patient's functional independence and quality of life. Rehab Potential: Good for established goals. Patient / Family Goal: Patient anticipates returning home. Patient and/or family were instructed on functional diagnosis, prognosis/goals, and OT plan of care. Demonstrated Good understanding. Eval Complexity: Low    Time In: 1525  Time Out: 1550  Total Treatment Time: 10 minutes      Minutes Units   OT Eval Low 46791 15 1   OT Eval Medium 26246     OT Eval High 05416     OT Re-Eval [id number]     Therapeutic Ex 18099     Therapeutic Activities 63843     ADL/Self Care 23164 10 1   Orthotic Management 22649     Neuro Re-Ed 66566     Non-Billable Time N/A ---     Evaluation time includes thorough review of current medical information, gathering information on past medical history/social history and prior level of function, completion of standardized testing/informal observation of tasks, assessment of data, and education on plan of care and goals. Debora Oconnell OTR/L  License Number: [id number]

## 2022-04-13 NOTE — ANESTHESIA PROCEDURE NOTES
Spinal Block    Patient location during procedure: OR  Start time: 4/13/2022 9:09 AM  End time: 4/13/2022 9:15 AM  Reason for block: procedure for pain, post-op pain management, primary anesthetic and at surgeon's request  Staffing  Performed: anesthesiologist and resident/CRNA   Anesthesiologist: Elizabeth Real MD  Resident/CRNA: PREETI Kendall CRNA  Preanesthetic Checklist  Completed: patient identified, IV checked, site marked, risks and benefits discussed, surgical consent, monitors and equipment checked, pre-op evaluation, timeout performed, anesthesia consent given, oxygen available and patient being monitored  Spinal Block  Patient position: sitting  Prep: ChloraPrep  Patient monitoring: continuous pulse ox, continuous capnometry and frequent blood pressure checks  Approach: midline  Location: L3/L4  Provider prep: mask and sterile gloves  Local infiltration: lidocaine  Agent: bupivacaine  Dose: 1.8  Dose: 1.8  Needle  Needle type: Sprotte Tip   Needle length: 4 in  Assessment  Sensory level: T4  Swirl obtained: Yes  CSF: clear  Attempts: 2  Hemodynamics: stable

## 2022-04-13 NOTE — ANESTHESIA POSTPROCEDURE EVALUATION
Department of Anesthesiology  Postprocedure Note    Patient: Albania Ferguson  MRN: [id number]  YOB: 1950  Date of evaluation: 4/13/2022  Time:  11:58 AM     Procedure Summary     Date: 04/13/22 Room / Location: [facility] OR 03 / [facility]    Anesthesia Start: 5188 Anesthesia Stop: 5522    Procedure: KERWIN ROBOT ASSISTED RIGHT TOTAL HIP ARTHROPLASTY (Right Hip) Diagnosis: (OSTEOARTHRITIS RIGHT HIP)    Surgeons: Kayla Love MD Responsible Provider: Joanna Sawyer MD    Anesthesia Type: spinal ASA Status: 3          Anesthesia Type: spinal    Mark Phase I: Mark Score: 10    Mark Phase II:      Last vitals: Reviewed and per EMR flowsheets.        Anesthesia Post Evaluation    Patient location during evaluation: PACU  Patient participation: complete - patient participated  Level of consciousness: awake and alert  Pain score: 0  Airway patency: patent  Nausea & Vomiting: no nausea and no vomiting  Complications: no  Cardiovascular status: blood pressure returned to baseline  Respiratory status: acceptable  Hydration status: euvolemic  Multimodal analgesia pain management approach

## 2022-04-13 NOTE — PROGRESS NOTES
Database initiated. Patient is A&O from home with family. He has a cane and walker for ambulation and is RA at baseline. The patient is a 51y Male complaining of dental pain/injury.

## 2022-04-13 NOTE — ANESTHESIA PRE PROCEDURE
Department of Anesthesiology  Preprocedure Note       Name:  Harlan Gordon   Age:  66 y.o.  :  1950                                          MRN:  [id number]         Date:  2022      Surgeon: Jax Johansen):  Lori Brownlee MD    Procedure: Procedure(s):  RIGHT TOTAL HIP ARTHROPLASTY KERWIN ROBOTIC ++TALI++    Medications prior to admission:   Prior to Admission medications    Medication Sig Start Date End Date Taking? Authorizing Provider   doxazosin (CARDURA) 4 MG tablet Take 4 mg by mouth 2 times daily Instructed to take morning of surgery with a sip of water    Historical Provider, MD   carvedilol (COREG) 25 MG tablet Take 25 mg by mouth 2 times daily (with meals) Instructed to take morning of surgery with a sip of water    Historical Provider, MD   losartan (COZAAR) 100 MG tablet Take 100 mg by mouth daily    Historical Provider, MD   venlafaxine (EFFEXOR XR) 150 MG extended release capsule Take 150 mg by mouth daily Instructed to take morning of surgery with a sip of water    Historical Provider, MD   atorvastatin (LIPITOR) 20 MG tablet Take 20 mg by mouth daily    Historical Provider, MD   meloxicam (MOBIC) 15 MG tablet Take 15 mg by mouth daily Last dose 22    Historical Provider, MD   gabapentin (NEURONTIN) 300 MG capsule Take 300 mg by mouth 3 times daily. Instructed to take morning of surgery with a sip of water    Historical Provider, MD   mirtazapine (REMERON) 30 MG tablet Take 30 mg by mouth nightly    Historical Provider, MD   levothyroxine (SYNTHROID) 25 MCG tablet Take 25 mcg by mouth Daily    Historical Provider, MD   HYDROcodone-acetaminophen (NORCO) 7.5-325 MG per tablet Take 1 tablet by mouth every 8 hours as needed for Pain.  Instructed to take morning of surgery with a sip of water if needed    Historical Provider, MD   naproxen (NAPROSYN) 250 MG tablet Take 250 mg by mouth 2 times daily (with meals) Last dose 22    Historical Provider, MD   aspirin 81 MG EC tablet Take 81 mg by mouth daily Last dose 4-6-22    Historical Provider, MD       Current medications:    Current Facility-Administered Medications   Medication Dose Route Frequency Provider Last Rate Last Admin    dexamethasone (PF) (DECADRON) injection 8 mg  8 mg IntraVENous Once Evelyn Elizabeth MD        0.9 % sodium chloride infusion   IntraVENous Continuous Evelyn Elizabeth MD        sodium chloride flush 0.9 % injection 5-40 mL  5-40 mL IntraVENous 2 times per day Evelyn Elizabeth MD        sodium chloride flush 0.9 % injection 5-40 mL  5-40 mL IntraVENous PRN Evelyn Elizabeth MD        0.9 % sodium chloride infusion  25 mL IntraVENous PRN Evelyn Elizabeth MD        ceFAZolin (ANCEF) 2000 mg in sterile water 20 mL IV syringe  2,000 mg IntraVENous On Call to [facility] [address], MD        tranexamic acid (CYKLOKAPRON) 1,000 mg in dextrose 5 % 100 mL IVPB  1,000 mg IntraVENous On Call to [facility] [address] Anay           Allergies:  No Known Allergies    Problem List:  There is no problem list on file for this patient.       Past Medical History:        Diagnosis Date    Arthritis     osteo    Depression     Hyperlipidemia     Hypertension     Thyroid disease        Past Surgical History:        Procedure Laterality Date    APPENDECTOMY         Social History:    Social History     Tobacco Use    Smoking status: Never Smoker    Smokeless tobacco: Never Used   Substance Use Topics    Alcohol use: Not on file     Comment: few beers a week                                Counseling given: Not Answered      Vital Signs (Current):   Vitals:    04/13/22 0728   BP: (!) 180/84   Pulse: 96   Resp: 20   Temp: 97.8 °F (36.6 °C)   TempSrc: Temporal   SpO2: 95%                                              BP Readings from Last 3 Encounters:   04/13/22 (!) 180/84   04/08/22 (!) 163/74       NPO Status: Time of last liquid consumption: 2000                        Time of last solid consumption: 1400                        Date of last liquid consumption: 04/12/22 Date of last solid food consumption: 04/12/22    BMI:   Wt Readings from Last 3 Encounters:   04/08/22 253 lb (114.8 kg)     There is no height or weight on file to calculate BMI.    CBC:   Lab Results   Component Value Date    WBC 7.8 04/01/2014    RBC 4.29 04/01/2014    HGB 13.2 04/01/2014    HCT 39.3 04/01/2014    MCV 91.7 04/01/2014    RDW 13.9 04/01/2014     04/01/2014       CMP:   Lab Results   Component Value Date     04/01/2014    K 3.6 04/01/2014    CL 98 04/01/2014    CO2 30 04/01/2014    BUN 13 04/01/2014    CREATININE 0.8 04/01/2014    GFRAA >60 04/01/2014    LABGLOM >60 04/01/2014    GLUCOSE 95 04/01/2014    PROT 6.8 04/01/2014    CALCIUM 9.4 04/01/2014    BILITOT 0.9 04/01/2014    ALKPHOS 73 04/01/2014    AST 24 04/01/2014    ALT 19 04/01/2014       POC Tests: No results for input(s): POCGLU, POCNA, POCK, POCCL, POCBUN, POCHEMO, POCHCT in the last 72 hours.     Coags: No results found for: PROTIME, INR, APTT    HCG (If Applicable): No results found for: PREGTESTUR, PREGSERUM, HCG, HCGQUANT     ABGs: No results found for: PHART, PO2ART, BYU7MFU, LQI8TUX, BEART, N2AICFFH     Type & Screen (If Applicable):  No results found for: LABABO, LABRH    Drug/Infectious Status (If Applicable):  No results found for: HIV, HEPCAB    COVID-19 Screening (If Applicable): No results found for: COVID19        Anesthesia Evaluation  Patient summary reviewed and Nursing notes reviewed no history of anesthetic complications:   Airway: Mallampati: III  TM distance: >3 FB   Neck ROM: full  Mouth opening: > = 3 FB Dental:      Comment: Several missing teeth    Pulmonary:Negative Pulmonary ROS and normal exam                               Cardiovascular:  Exercise tolerance: good (>4 METS),   (+) hypertension:, hyperlipidemia                  Neuro/Psych:   (+) depression/anxiety             GI/Hepatic/Renal: Neg GI/Hepatic/Renal ROS            Endo/Other:    (+) hypothyroidism::., .                 Abdominal:             Vascular: negative vascular ROS. Other Findings:             Anesthesia Plan      spinal     ASA 3     (GETA as back up)        Anesthetic plan and risks discussed with patient. Plan discussed with CRNA.                   Deep Green MD   4/13/2022    Note reviewed and agree with plan Teena Haji MD

## 2022-04-13 NOTE — CARE COORDINATION
Case Management: Social Work Case Management Initial Assessment  Kisha Dodson,         Met with:patient, Kristofer Wiley, DAUGHTER-IN-LAW PAL  Information verified: address, contacts, phone number, , insurance Yes  PCP: Clayton Mcmahon MD  Pharmacy:   [address] [facility],  [address], [address]  [address] [facility]  Phone: [phone] Fax: [facility] [address] , [address] [facility] [phone] Jett Chouiden [phone]  [address] [place] [address] [address] [address]  Phone: [phone] Fax: [phone]         Discharge Planning  Patient Status Order: Observation Date: 22  Readmission within last 30 days:  yes  Current Residence: [address] [place] [address]  Living Arrangements:  WITH SON PAUL, HIS WIFE, ANOTHER SON MIGUELITO  Home Access: RANCH WITH TUB, SHOWER, STANDARD COMMODE  Entrance Stairs-Number of Steps: RAMP [employer]  Support Systems:  CHILDREN  Current Services PTA:   Supplier: N/A  Patient able to perform ADL's: WILL NEED SOME ASSISTANCE  DME used to aid ambulation prior to admission: WHEELED WALKER, ROLLATOR, COMMODE RISER, SHOWER BENCH    Potential Assistance Needed:  THERAPY  Potential Assistance Purchasing Medications:     Does patient want to participate in local refill/meds to beds program?: Yes    Patient agreeable to home care: Yes  Type of Home Care Services:  THERAPY  Patient expects to be discharged to:  HOME    Prior SNF/Rehab Placement and Facility: NO  Agreeable to SNF/Rehab: WILL CONSIDER IF NEEDED    Choices given to patient: YES    Expected Discharge date:  22  Follow Up Appointment: Best Day/ Time:      Social Work Assessment/Plan: Mr. Camacho was met in ortho camp and given all ortho discharge information. Social service met with Mr. Camacho, his son Raymond Ireland wife Clemens Sandhoff. We discussed discharge goals and Mr. Camacho is prepared to return home with [facility].  He has all needed DME. Jody at [facility] is following. Social work to confirm home plans post PT/OT evaluations.       Electronically signed by CARLITO Anaya on 4/13/22 at 2:34 PM EDT

## 2022-04-13 NOTE — INTERVAL H&P NOTE
Update History & Physical    H&P reviewed. No changes.     Electronically signed by Terra Cardona MD on 4/13/2022 at 7:02 AM

## 2022-04-13 NOTE — PROGRESS NOTES
Physical Therapy    Facility/Department: [facility] SURGERY  Initial Assessment    NAME: Tripp Jackson  : 1950  MRN: [id number]    Date of Service: 2022    REQUIRES PT FOLLOW UP: Yes       Patient Diagnosis(es): There were no encounter diagnoses. has a past medical history of Arthritis, Depression, Hyperlipidemia, Hypertension, and Thyroid disease. has a past surgical history that includes Appendectomy. Evaluating Therapist: Nazanin Marcano, PT     rec ww     Referring Provider:  Terra Cardona MD    PT order : PT eval and treat     Room #: 696   DIAGNOSIS:  OA s/p R ALICIA 2022   PRECAUTIONS: falls, FWBAT, posterolateral hip precautions     Social:  Pt lives with  2 sons  in a  1  floor plan  With ramp  to enter. Prior to admission pt walked with  Fwd: [employer] or rollator      Initial Evaluation  Date:  2022 Treatment      Short Term/ Long Term   Goals   Was pt agreeable to Eval/treatment? Yes      Does pt have pain? R hip and knee pain ( knee worse than hip)      Bed Mobility  Rolling:  NT   Supine to sit:  Min assist   Sit to supine:  NT   Scooting:  Min assist in sit    SBA    Transfers Sit to stand:  Min assist   Stand to sit: min assist   Stand pivot: NT   SBA    Ambulation     15 and 70  feet with ww  with  CGA    200  feet with ww  with  SBA        Stair negotiation: ascended and descended NT    4  steps with  B  rail with  SBA    LE ROM  Decreased throughout R hip, distally WFL      LE strength  3-/ 5 R hip, 4-/ 5 distally      AM- PAC RAW score  17/ 24            Pt is alert and Oriented x  4      Balance:  CGA . Fall risk due to  Weakness, increased pain   Endurance: WFL   Bed/Chair alarm: no      ASSESSMENT  Pt displays functional ability as noted in the objective portion of this evaluation.         Conditions Requiring Skilled Therapeutic Intervention:    [x]Decreased strength     [x]Decreased ROM  [x]Decreased functional mobility  [x]Decreased balance   [x]Decreased endurance [x]Decreased posture  []Decreased sensation  []Decreased coordination   []Decreased vision  []Decreased safety awareness   [x]Increased pain         Treatment/Education:    Pt in bed  upon arrival ; agreeable to PT. Instructed in APs, QS, and GS, and hip precautions prior to mobility. Mobility as above. Cues for technique with bed mobility and scooting in sit to maintain hip precautions. Pt needed assist with R LE. Instructed on proper hand and foot placement with transfers and ww safety with gait. Cues given for upright posture and reciprocal pattern with gait     Pt educated on fall risk,  Safe and proper technique with mobility        Patient response to education:   Pt verbalized understanding Pt demonstrated skill Pt requires further education in this area   x  with cues   x       Comments:  Pt left  In chair after session, with call light in reach. Rehab potential is Good for reaching above PT goals. Pts/ family goals   1. Home     Patient and or family understand(s) diagnosis, prognosis, and plan of care. - yes     PLAN  PT care will be provided in accordance with the objectives noted above. Whenever appropriate, clear delegation orders will be provided for nursing staff. Exercises and functional mobility practice will be used as well as appropriate assistive devices or modalities to obtain goals. Patient and family education will also be administered as needed.         PLAN OF CARE:    Current Treatment Recommendations     [x] Strengthening to improve independence with functional mobility   [x] ROM to improve independence with functional mobility   [x] Balance Training to improve static/dynamic balance and to reduce fall risk  [x] Endurance Training to improve activity tolerance during functional mobility   [x] Transfer Training to improve safety and independence with all functional transfers   [x] Gait Training to improve gait mechanics, endurance and assess need for appropriate assistive

## 2022-04-13 NOTE — DISCHARGE SUMMARY
Deedee Hodge  [id number]  89 y.o.  1950    Admit date: 4/13/2022    Discharge date and time: No discharge date for patient encounter. Admitting Physician: ETHAN Talley MD    Discharge Physician: Jaida Talley MD    Admission Diagnoses: right Hip Osteoarthritis    Discharge Diagnoses: Same    Admission Condition: Good    Discharged Condition: Good    Procedure and Date: right Total Hip Arthroplasty     Hospital Course: A total hip arthroplasty was performed on 4/13/2022. Following this procedure the patient was admitted for a 1 day postoperative hospital stay. During this admission, DVT prophylaxis was followed using bilateral ICDs and ASA. Post-operative pain control was achieved with the use of IV/oral pain medications. Discharge plans were discussed with the patient with the aid of . Disposition: Home    Patient Instructions:     Medications for pain:    Acetaminophen - Take one tablet every 6 hours for 1 week. Then take every 6 hours as needed for pain. Tramadol - Take one tablet every 6 hours for 1 week. Then take every 6 hours as needed for pain. Oxycodone 1 tablet every 4 hours as needed for pain. You can take 2 every 6 hours for severe pain. Medication for DVT prophylaxis (Prevention of blood clots)  Aspirin - Take 1 tablet daily for 6 weeks for prevention of blood clots    Medication for constipation:  Colace - Take 1 tablet every 12 hours as needed for constipation        Activity: Weight bear as tolerated with a walker. Posterior hip precautions. Diet: regular diet  Wound Care: Patient should remove dressing in 9 days. Patient can shower with the dressing on but should not bathe. After removing, the dressing keep incision clean and dry    Follow-up with Dr. Tierra Kate in 2 weeks.

## 2022-04-14 VITALS
DIASTOLIC BLOOD PRESSURE: 69 MMHG | RESPIRATION RATE: 18 BRPM | SYSTOLIC BLOOD PRESSURE: 150 MMHG | OXYGEN SATURATION: 97 % | BODY MASS INDEX: 38.34 KG/M2 | HEART RATE: 92 BPM | HEIGHT: 68 IN | TEMPERATURE: 97.4 F | WEIGHT: 253 LBS

## 2022-04-14 LAB
ANION GAP SERPL CALCULATED.3IONS-SCNC: 11 MMOL/L (ref 7–16)
BUN BLDV-MCNC: 17 MG/DL (ref 6–23)
CALCIUM SERPL-MCNC: 9.1 MG/DL (ref 8.6–10.2)
CHLORIDE BLD-SCNC: 104 MMOL/L (ref 98–107)
CO2: 25 MMOL/L (ref 22–29)
CREAT SERPL-MCNC: 0.7 MG/DL (ref 0.7–1.2)
GFR AFRICAN AMERICAN: >60
GFR NON-AFRICAN AMERICAN: >60 ML/MIN/1.73
GLUCOSE BLD-MCNC: 120 MG/DL (ref 74–99)
HCT VFR BLD CALC: 32.6 % (ref 37–54)
HEMOGLOBIN: 10.9 G/DL (ref 12.5–16.5)
MCH RBC QN AUTO: 32.5 PG (ref 26–35)
MCHC RBC AUTO-ENTMCNC: 33.4 % (ref 32–34.5)
MCV RBC AUTO: 97.3 FL (ref 80–99.9)
PDW BLD-RTO: 13.6 FL (ref 11.5–15)
PLATELET # BLD: 206 E9/L (ref 130–450)
PMV BLD AUTO: 9.4 FL (ref 7–12)
POTASSIUM SERPL-SCNC: 3.9 MMOL/L (ref 3.5–5)
RBC # BLD: 3.35 E12/L (ref 3.8–5.8)
SODIUM BLD-SCNC: 140 MMOL/L (ref 132–146)
WBC # BLD: 11.2 E9/L (ref 4.5–11.5)

## 2022-04-14 PROCEDURE — 97530 THERAPEUTIC ACTIVITIES: CPT

## 2022-04-14 PROCEDURE — G0378 HOSPITAL OBSERVATION PER HR: HCPCS

## 2022-04-14 PROCEDURE — 97535 SELF CARE MNGMENT TRAINING: CPT

## 2022-04-14 PROCEDURE — 96376 TX/PRO/DX INJ SAME DRUG ADON: CPT

## 2022-04-14 PROCEDURE — 2580000003 HC RX 258: Performed by: ORTHOPAEDIC SURGERY

## 2022-04-14 PROCEDURE — 96374 THER/PROPH/DIAG INJ IV PUSH: CPT

## 2022-04-14 PROCEDURE — 36415 COLL VENOUS BLD VENIPUNCTURE: CPT

## 2022-04-14 PROCEDURE — 85027 COMPLETE CBC AUTOMATED: CPT

## 2022-04-14 PROCEDURE — 6370000000 HC RX 637 (ALT 250 FOR IP): Performed by: ORTHOPAEDIC SURGERY

## 2022-04-14 PROCEDURE — 96375 TX/PRO/DX INJ NEW DRUG ADDON: CPT

## 2022-04-14 PROCEDURE — 51701 INSERT BLADDER CATHETER: CPT

## 2022-04-14 PROCEDURE — 6360000002 HC RX W HCPCS: Performed by: ORTHOPAEDIC SURGERY

## 2022-04-14 PROCEDURE — 80048 BASIC METABOLIC PNL TOTAL CA: CPT

## 2022-04-14 PROCEDURE — 51798 US URINE CAPACITY MEASURE: CPT

## 2022-04-14 RX ORDER — ACETAMINOPHEN 325 MG/1
650 TABLET ORAL EVERY 6 HOURS
Qty: 120 TABLET | Refills: 3 | Status: SHIPPED | OUTPATIENT
Start: 2022-04-14

## 2022-04-14 RX ORDER — TAMSULOSIN HYDROCHLORIDE 0.4 MG/1
0.4 CAPSULE ORAL NIGHTLY
Status: DISCONTINUED | OUTPATIENT
Start: 2022-04-14 | End: 2022-04-14

## 2022-04-14 RX ORDER — SENNA AND DOCUSATE SODIUM 50; 8.6 MG/1; MG/1
1 TABLET, FILM COATED ORAL 2 TIMES DAILY
Qty: 30 TABLET | Refills: 0 | Status: SHIPPED | OUTPATIENT
Start: 2022-04-14

## 2022-04-14 RX ORDER — OXYCODONE HYDROCHLORIDE 10 MG/1
10 TABLET ORAL EVERY 4 HOURS PRN
Qty: 42 TABLET | Refills: 0 | Status: SHIPPED | OUTPATIENT
Start: 2022-04-14 | End: 2022-04-21

## 2022-04-14 RX ADMIN — LEVOTHYROXINE SODIUM 25 MCG: 0.03 TABLET ORAL at 09:28

## 2022-04-14 RX ADMIN — KETOROLAC TROMETHAMINE 15 MG: 30 INJECTION, SOLUTION INTRAMUSCULAR; INTRAVENOUS at 09:27

## 2022-04-14 RX ADMIN — KETOROLAC TROMETHAMINE 15 MG: 30 INJECTION, SOLUTION INTRAMUSCULAR; INTRAVENOUS at 03:06

## 2022-04-14 RX ADMIN — ASPIRIN 325 MG: 325 TABLET, COATED ORAL at 09:28

## 2022-04-14 RX ADMIN — DOXAZOSIN 4 MG: 4 TABLET ORAL at 09:28

## 2022-04-14 RX ADMIN — ACETAMINOPHEN 650 MG: 325 TABLET ORAL at 09:28

## 2022-04-14 RX ADMIN — DOCUSATE SODIUM 50 MG AND SENNOSIDES 8.6 MG 1 TABLET: 8.6; 5 TABLET, FILM COATED ORAL at 09:28

## 2022-04-14 RX ADMIN — Medication 2000 MG: at 01:23

## 2022-04-14 RX ADMIN — SODIUM CHLORIDE, PRESERVATIVE FREE 10 ML: 5 INJECTION INTRAVENOUS at 09:32

## 2022-04-14 RX ADMIN — DEXAMETHASONE SODIUM PHOSPHATE 10 MG: 10 INJECTION INTRAMUSCULAR; INTRAVENOUS at 09:27

## 2022-04-14 RX ADMIN — CARVEDILOL 25 MG: 25 TABLET, FILM COATED ORAL at 09:28

## 2022-04-14 RX ADMIN — LOSARTAN POTASSIUM 100 MG: 50 TABLET, FILM COATED ORAL at 09:28

## 2022-04-14 RX ADMIN — ATORVASTATIN CALCIUM 20 MG: 20 TABLET, FILM COATED ORAL at 09:28

## 2022-04-14 RX ADMIN — VENLAFAXINE HYDROCHLORIDE 150 MG: 150 CAPSULE, EXTENDED RELEASE ORAL at 09:28

## 2022-04-14 RX ADMIN — OXYCODONE 10 MG: 5 TABLET ORAL at 07:39

## 2022-04-14 RX ADMIN — GABAPENTIN 300 MG: 300 CAPSULE ORAL at 09:28

## 2022-04-14 RX ADMIN — ACETAMINOPHEN 650 MG: 325 TABLET ORAL at 03:06

## 2022-04-14 ASSESSMENT — PAIN SCALES - GENERAL
PAINLEVEL_OUTOF10: 5
PAINLEVEL_OUTOF10: 3
PAINLEVEL_OUTOF10: 7
PAINLEVEL_OUTOF10: 6
PAINLEVEL_OUTOF10: 4

## 2022-04-14 ASSESSMENT — PAIN DESCRIPTION - ONSET
ONSET: ON-GOING
ONSET: ON-GOING

## 2022-04-14 ASSESSMENT — PAIN DESCRIPTION - FREQUENCY
FREQUENCY: INTERMITTENT
FREQUENCY: INTERMITTENT

## 2022-04-14 ASSESSMENT — PAIN DESCRIPTION - DESCRIPTORS
DESCRIPTORS: ACHING;DISCOMFORT
DESCRIPTORS: ACHING;DISCOMFORT

## 2022-04-14 ASSESSMENT — PAIN - FUNCTIONAL ASSESSMENT
PAIN_FUNCTIONAL_ASSESSMENT: ACTIVITIES ARE NOT PREVENTED
PAIN_FUNCTIONAL_ASSESSMENT: ACTIVITIES ARE NOT PREVENTED

## 2022-04-14 ASSESSMENT — PAIN DESCRIPTION - LOCATION
LOCATION: HIP
LOCATION: HIP

## 2022-04-14 ASSESSMENT — PAIN DESCRIPTION - PAIN TYPE
TYPE: ACUTE PAIN;SURGICAL PAIN
TYPE: ACUTE PAIN;SURGICAL PAIN

## 2022-04-14 ASSESSMENT — PAIN DESCRIPTION - ORIENTATION
ORIENTATION: RIGHT
ORIENTATION: RIGHT

## 2022-04-14 ASSESSMENT — PAIN DESCRIPTION - PROGRESSION
CLINICAL_PROGRESSION: GRADUALLY WORSENING
CLINICAL_PROGRESSION: GRADUALLY WORSENING

## 2022-04-14 NOTE — PROGRESS NOTES
Orthopaedic Surgery Progress Note  ETHAN Thornton MD      SUBJECTIVE:  Pt had difficulty urinating overnight and had a straight cath. Pain controlled. Denies chest pain or shortness of breath. OBJECTIVE:   AAOx3, NAD    Right lower extremity    Dressings C/D/I  SILT L1-S1  TA/EHL/GS intact  Calf soft, NT  +2 DP, W/WP     Data:  CBC:   Lab Results   Component Value Date    WBC 11.2 04/14/2022    RBC 3.35 04/14/2022    HGB 10.9 04/14/2022    HCT 32.6 04/14/2022    MCV 97.3 04/14/2022    MCH 32.5 04/14/2022    MCHC 33.4 04/14/2022    RDW 13.6 04/14/2022     04/14/2022    MPV 9.4 04/14/2022     BMP:    Lab Results   Component Value Date     04/14/2022    K 3.9 04/14/2022     04/14/2022    CO2 25 04/14/2022    BUN 17 04/14/2022    LABALBU 4.2 04/01/2014    CREATININE 0.7 04/14/2022    CALCIUM 9.1 04/14/2022    GFRAA >60 04/14/2022    LABGLOM >60 04/14/2022    GLUCOSE 120 04/14/2022         A/P: POD 1 right ALICIA, urinary retention  - Urology consult  - WBAT  - Pain control  - PT/OT  - DVT prophylaxis  - D/C planning for home    ETHAN Thornton MD

## 2022-04-14 NOTE — PROGRESS NOTES
Physical Therapy  Facility/Department: [address] ORTHO SURGERY  Daily Treatment Note  NAME: Sha Love  : 1950  MRN: [id number]    Date of Service: 2022      Patient Diagnosis(es): The encounter diagnosis was Status post total hip replacement, right.     has a past medical history of Arthritis, Depression, Hyperlipidemia, Hypertension, and Thyroid disease. has a past surgical history that includes Appendectomy and Total hip arthroplasty (Right, 2022). Evaluating Therapist: Dallas Jade PT      rec ww      Referring Provider:  Kan Mukherjee MD     PT order : PT eval and treat      Room #: 702   DIAGNOSIS:  OA s/p R ALICIA 2022   PRECAUTIONS: falls, FWBAT, posterolateral hip precautions      Social:  Pt lives with  2 sons  in a  1  floor plan  With ramp  to enter. Prior to admission pt walked with  [employer] or rollator        Initial Evaluation  Date:  2022 Treatment     2022 AM Short Term/ Long Term   Goals   Was pt agreeable to Eval/treatment? Yes   yes      Does pt have pain? R hip and knee pain ( knee worse than hip)   R hip and knee pain      Bed Mobility  Rolling:  NT   Supine to sit:  Min assist   Sit to supine:  NT   Scooting:  Min assist in sit  NT   SBA    Transfers Sit to stand:  Min assist   Stand to sit: min assist   Stand pivot: NT  sit <> stand : SBA  SBA    Ambulation     15 and 70  feet with ww  with  CGA   15 and 160 feet x 1 with ww SBA   200  feet with ww  with  SBA          Stair negotiation: ascended and descended NT   NT, pt states he does not need to do   4  steps with  B  rail with  SBA    LE ROM  Decreased throughout R hip, distally WFL        LE strength  3-/ 5 R hip, 4-/ 5 distally        AM- PAC RAW score  17/ 24   18/ 24               Pt is alert and Oriented x  4       Balance:  CGA .  Fall risk due to  Weakness, increased pain   Endurance: WFL   Bed/Chair alarm: no      ASSESSMENT    Pt displays functional ability as noted in the objective portion of this treatment         Conditions Requiring Skilled Therapeutic Intervention:     [x]? Decreased strength                                      [x]? Decreased ROM  [x]? Decreased functional mobility  [x]? Decreased balance   [x]? Decreased endurance   [x]? Decreased posture  []? Decreased sensation  []? Decreased coordination   []? Decreased vision  []? Decreased safety awareness   [x]? Increased pain                Treatment/Education:    Pt in chair   upon arrival ; agreeable to PT. Instructed in and performed APs and LAQs in sit. Pt able to recall 2/3 of hip precautions; reviewed  prior to mobility. Mobility as above. Instructed on proper hand and foot placement with transfers and ww safety with gait. Cues given for upright posture and reciprocal pattern with gait .      Pt educated on fall risk,  Safe and proper technique with mobility         Patient response to education:   Pt verbalized understanding Pt demonstrated skill Pt requires further education in this area   x  with cues   x         Comments:  Pt left  In chair after session, with call light in reach.             PLAN    PT care will be provided in accordance with the objectives noted above. Whenever appropriate, clear delegation orders will be provided for nursing staff. Exercises and functional mobility practice will be used as well as appropriate assistive devices or modalities to obtain goals. Patient and family education will also be administered as needed.           PLAN OF CARE:     Current Treatment Recommendations      [x]? Strengthening to improve independence with functional mobility   [x]? ROM to improve independence with functional mobility   [x]? Balance Training to improve static/dynamic balance and to reduce fall risk  [x]? Endurance Training to improve activity tolerance during functional mobility   [x]? Transfer Training to improve safety and independence with all functional transfers   [x]?  Gait Training to improve gait mechanics, endurance and assess need for appropriate assistive device  [x]? Stair Training in preparation for safe discharge home and/or into the community   [x]? Positioning to prevent skin breakdown and contractures  [x]? Safety and Education Training   [x]? Patient/Caregiver Education   [x]? HEP  []? Other      Frequency of treatments will be BID x 2 days.     Time in: 0730  Time out:  0800      Con't with PT POC      CPT codes:  []? Low Complexity PT evaluation [id number]  []? Moderate Complexity PT evaluation 54463  []? High Complexity PT evaluation [id number]  []? PT Re-evaluation [id number]  []? Gait training 64343 minutes  [x]? Therapeutic activities 28743  20 minutes  []? Therapeutic exercises 39059  5 minutes  []?  Neuromuscular reeducation 58597  minutes         Henrik 18 number:  PT [id number]

## 2022-04-14 NOTE — PROGRESS NOTES
Occupational Therapy  OT BEDSIDE TREATMENT NOTE      Date:2022  Patient Name: Riley Landrum  MRN: [id number]  : 1950  Room: [address] [place] [address]-A     Per OT Eval:  Debora Sorto, OTR/L - [id number]     Referring Provider: Rachell Arrieta MD  Specific Provider Orders/Date: \"OT eval and treat\" - 2022     Diagnosis: Status post total hip replacement, right [S93.901]    Surgery: Patient underwent R ALICIA on 2022. Pertinent Medical History: HTN, OA, depression, hyperlipidemia      Precautions: fall risk, posterolateral hip precautions, FWBAT     Assessment of Current Deficits:    [x]? Functional mobility             [x]?ADLs           [x]? Strength                  [x]? Cognition   [x]? Functional transfers           [x]? IADLs         [x]? Safety Awareness   [x]? Endurance   []? Fine Coordination              [x]? Balance      []? Vision/perception   [x]? Sensation     []? Gross Motor Coordination  []? ROM           []? Delirium                   []? Motor Control      OT PLAN OF CARE   OT POC is based on physician orders, patient diagnosis, and results of clinical assessment.   Frequency/Duration 2-5 days/week for 2 weeks PRN   Specific OT Treatment Interventions to Include:   * Instruction/training on adapted ADL techniques and AE recommendations to increase functional independence within precautions       * Training on energy conservation strategies, correct breathing pattern and techniques to improve independence/tolerance for self-care routine  * Functional transfer/mobility training/DME recommendations for increased independence, safety, and fall prevention  * Patient/Family education to increase follow through with safety techniques and functional independence  * Recommendation of environmental modifications for increased safety with functional transfers/mobility and ADLs  * Therapeutic exercise to improve motor endurance, ROM, and functional strength for ADLs/functional transfers  * Therapeutic activities to facilitate/challenge dynamic balance, stand tolerance for increased safety and independence with ADLs  * Neuro-muscular re-education: facilitation of righting/equilibrium reactions, midline orientation, scapular stability/mobility, normalization of muscle tone, and facilitation of volitional active controled movement     Recommended Adaptive Equipment: TBD      Home Living: Patient lives with his two sons in a one-floor setup (ramp entry); laundry is on the main living level. Bathroom Setup: walk-in shower (with seat and grab bars) and elevated toilet seat (with rails)  Equipment Owned: cane, rollator     Prior Level of Function (PLOF): Patient was mostly independent with ADLs; patient had needed assistance with socks prior. Patient's family can assist with IADLs. Patient was independent with functional mobility (with cane or rollator) prior to this hospitalization.     Pain Level: pt reported some pain in RLE but agreeable to therapy  Cognition: Pt alert, talkative and following commands.     Functional Assessment:                  AM-PAC Daily Activity Raw Score: 18/24    Initial Eval Status  Date: 4/13/2022 Treatment Status  Date: 4/14/22 Short Term Goals = Long Term Goals   Feeding Independent   N/A   Grooming CGA for hand hygiene while standing at sink. Cues given to maximize safety with management of walker.  SBA  To complete hand hygiene standing at sink Mod I  (seated/standing at sink)   UB Dressing Setup   Mod I  (including item retrieval)   LB Dressing Mod A  SBA  To don/doff socks using sock aid Mod I - while demonstrating Good adherence to posterolateral hip precautions and Good understanding of AE use. Bathing Mod A   Mod I - while demonstrating Good adherence to posterolateral hip precautions and Good understanding of AE/DME use. Toileting CGA to stand at toilet; patient unable to void (nursing notified). Cues given to maximize safety with management of walker within bathroom.  SBA  For clothing management and to urinate standing at commode  Mod I   Bed Mobility  Supine-to-Sit: Min A  Pt in chair Independent in order to maximize patient's independence with ADLs, re-positioning, and other functional tasks. Functional Transfers Sit-to-Stand: Min A   from EOB  SBA with wheeled walker  Sit<>stand from chair Independent   Functional Mobility CGA   (with walker) within patient's room, hallway, and bathroom. SBA with wheeled walker  To and from bathroom  Mod I with functional mobility (with device, as needed/appropriate) in order to maximize independence with ADLs/IADLs and other functional tasks. Balance Sitting: Good  (at EOB)  Standing: Fair  (with walker) Standing: SBA with wheeled walker  Fair+ dynamic standing balance during completion of ADLs/IADLs and other functional tasks. Activity Tolerance Fair+ Good with light activity  Patient will demonstrate Good understanding and consistent implementation of energy conservation techniques and work simplification techniques into ADL/IADL routines. Visual/  Perceptual WFL  Patient wears glasses.    N/A      Additional Long-Term Goal: Patient will increase functional independence to PLOF in order to allow patient to live in least restrictive environment. Comments: RN approved patient's participation in activities. Upon arrival, patient sitting in chair. At end of session, patient returned to chair with call light and phone within reach and all lines and tubes intact. Treatment: OT treatment provided this date included:    Instruction/training on safety and adapted techniques for completion of ADLs. Pt educated on precautions and how to maintain them when completing ADLs and verbalized understanding. Pt was educated on use of adaptive equipment for LB ADLs. Pt educated on use of long handled sponge and verbalized good understanding. Pt educated on the use of long handled shoe horn and verbalized good understanding.  Pt educated on the use of a reacher to assist with donning/doffing pants and doffing socks. Pt doffed socks with SBA using reacher while maintaining hip precautions. Instruction and demonstration on sock aid use provided. Pt able to don socks with SBA with use of sock aid. Pt stood at commode with SBA and urinated. Pt completed hand hygiene standing at sink with SBA and cues for proper wheeled walker placement at sink.   Instruction/training on safe functional mobility/transfer techniques. Pt was SBA to stand from chair with cues for hand placement and safe technique to improve safety with ADLs and functional tasks. Pt walked to and from bathroom with cues for safe wheeled walker management and navigation. Good adherence to hip precautions. Further skilled OT treatment indicated to increase patient's safety and independence with completion of ADL/IADL tasks in order to maximize patient's functional independence and quality of life. Education: Patient education provided regardin) hip precautions 2) safe ADL techniques 3) use of AE for LB tasks. Patient demonstrated good understanding. · Patient has made progress towards set goals. · Continue OT plan of care.     Time In: 6035  Time Out: 0910  Total Treatment Time: 21 minutes      Minutes Units   Therapeutic Ex 93507     Therapeutic Activities 46267     ADL/Self Care 85948 21 1   Orthotic Management 18916     Neuro Re-Ed 22834     Non-Billable Time  ---     FAVIOLA Kirkpatrick/DENISE  License Number: [id number]
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
## (undated) DEVICE — PILLOW POS W15XH6XL22IN RASPBERRY FOAM ABD W/ STRP DISP FOR

## (undated) DEVICE — BOWL AND CEMENT CARTRIDGE WITH BREAKAWAY FEMORAL NOZZLE: Brand: ACM

## (undated) DEVICE — 3M™ TEGADERM™ TRANSPARENT FILM DRESSING FRAME STYLE, 1626W, 4 IN X 4-3/4 IN (10 CM X 12 CM), 50/CT 4CT/CASE: Brand: 3M™ TEGADERM™

## (undated) DEVICE — PIN BNE FIX TEMP L140MM DIA4MM MAKO

## (undated) DEVICE — DRAPE,TOP,102X53,STERILE: Brand: MEDLINE

## (undated) DEVICE — 1000 S-DRAPE TOWEL DRAPE 10/BX: Brand: STERI-DRAPE™

## (undated) DEVICE — BLADE ES L6IN ELASTOMERIC COAT EXT DURABLE BEND UPTO 90DEG

## (undated) DEVICE — KIT TRK HIP PROC VIZADISC

## (undated) DEVICE — TUBING, SUCTION, 9/32" X 10', STRAIGHT: Brand: MEDLINE

## (undated) DEVICE — SET ORTHO STD STORTSTD1

## (undated) DEVICE — MARKER RAD 3.5MM HEX CKPT STEREOTAXIC IMAG LESION LOC FOR

## (undated) DEVICE — SOLUTION IRRIG 3000ML 0.9% SOD CHL USP UROMATIC PLAS CONT

## (undated) DEVICE — 3M™ COBAN™ NL STERILE NON-LATEX SELF-ADHERENT WRAP, 2084S, 4 IN X 5 YD (10 CM X 4,5 M), 18 ROLLS/CASE: Brand: 3M™ COBAN™

## (undated) DEVICE — Device

## (undated) DEVICE — TUBE IRRIG HNDPC HI FLO TP INTRPULS W/SUCTION TUBE

## (undated) DEVICE — INSTRUMENT SYSTEM 7 BATTERY REUSABLE

## (undated) DEVICE — KIT DRP FOR RIO ROBOTIC ARM ASST SYS

## (undated) DEVICE — INSTRUMENT CORE REAMERS STRYKER REUSABLE

## (undated) DEVICE — DISPOSABLE FEMORAL CEMENT                                    COMPRESSOR CAP STERILE

## (undated) DEVICE — BLADE CLIPPER GEN PURP NS

## (undated) DEVICE — KIT SURG W7XL11IN 2 PKT UNTREATED NA

## (undated) DEVICE — 3M™ IOBAN™ 2 ANTIMICROBIAL INCISE DRAPE 6650EZ: Brand: IOBAN™ 2

## (undated) DEVICE — SOLUTION IV IRRIG POUR BRL 0.9% SODIUM CHL 2F7124

## (undated) DEVICE — K-WIRE
Type: IMPLANTABLE DEVICE | Site: HIP | Status: NON-FUNCTIONAL
Brand: PRO
Removed: 2022-04-13

## (undated) DEVICE — TRAY STRYKER MAKO UNI HIP POWER

## (undated) DEVICE — PACK PROCEDURE SURG GEN CUST

## (undated) DEVICE — TOWEL,OR,DSP,ST,BLUE,STD,6/PK,12PK/CS: Brand: MEDLINE

## (undated) DEVICE — ADHESIVE SKIN CLSR 0.7ML TOP DERMBND ADV

## (undated) DEVICE — NEEDLE HYPO 22GA L1.5IN BLK POLYPR HUB S STL REG BVL STR

## (undated) DEVICE — CHLORAPREP 26ML ORANGE

## (undated) DEVICE — SPONGE LAP W18XL18IN WHT COT 4 PLY FLD STRUNG RADPQ DISP ST

## (undated) DEVICE — SOLUTION IV IRRIG WATER 1000ML POUR BRL 2F7114

## (undated) DEVICE — DOUBLE BASIN SET: Brand: MEDLINE INDUSTRIES, INC.

## (undated) DEVICE — TRAY ACCULADE 2 BROACH HIP TRAY  REUSABLE

## (undated) DEVICE — NEEDLE FLTR 18GA L1.5IN MEM THK5UM BLNT DISP

## (undated) DEVICE — STRIP,CLOSURE,WOUND,MEDI-STRIP,1/2X4: Brand: MEDLINE

## (undated) DEVICE — COVER HNDL LT DISP

## (undated) DEVICE — 3M™ STERI-DRAPE™ U-DRAPE 1015: Brand: STERI-DRAPE™

## (undated) DEVICE — INSTRUMENT CLAMP TOWEL LARGE REUSABLE

## (undated) DEVICE — 4-PORT MANIFOLD: Brand: NEPTUNE 2

## (undated) DEVICE — TRAY ACCOLADE BASIL HIP TRAY  REUSABLE

## (undated) DEVICE — GLOVE ORTHO 8   MSG9480

## (undated) DEVICE — TOTAL HIP PK

## (undated) DEVICE — TRAY HIP EXTRAS REUSABLE

## (undated) DEVICE — ELECTRODE PT RET AD L9FT HI MOIST COND ADH HYDRGEL CORDED

## (undated) DEVICE — GOWN,SIRUS,FABRNF,XL,20/CS: Brand: MEDLINE

## (undated) DEVICE — PEEL-AWAY HOOD: Brand: FLYTE, SURGICOOL

## (undated) DEVICE — GAUZE,SPONGE,4"X4",8PLY,STRL,LF,10/TRAY: Brand: MEDLINE

## (undated) DEVICE — SKIN PREP TRAY 4 COMPARTM TRAY: Brand: MEDLINE INDUSTRIES, INC.

## (undated) DEVICE — 3M™ IOBAN™ 2 ANTIMICROBIAL INCISE DRAPE 6651EZ: Brand: IOBAN™ 2

## (undated) DEVICE — SUTURE STRATAFIX SYMMETRIC PDS + SZ 1 L18IN ABSRB VLT OS-6 SXPP1A201

## (undated) DEVICE — 2108 SERIES SAGITTAL BLADE, OFFSET (20.0 X 0.89 X 80.0MM)

## (undated) DEVICE — SYRINGE MED 30ML STD CLR PLAS LUERLOCK TIP N CTRL DISP

## (undated) DEVICE — GLOVE SURG SZ 8 CRM LTX FREE POLYISOPRENE POLYMER BEAD ANTI

## (undated) DEVICE — TRAY LAMBOTS REUSABLE

## (undated) DEVICE — DRAPE,REIN 53X77,STERILE: Brand: MEDLINE

## (undated) DEVICE — TAPE ADH W3INXL10YD WHT COT WVN BK POWERFUL RUB BASE HIGHLY

## (undated) DEVICE — TRAY ORTHO2 REUSABLE

## (undated) DEVICE — TRAY STRYKER MAKO TOTAL HIP ARRAY